# Patient Record
Sex: FEMALE
[De-identification: names, ages, dates, MRNs, and addresses within clinical notes are randomized per-mention and may not be internally consistent; named-entity substitution may affect disease eponyms.]

---

## 2018-08-27 ENCOUNTER — HOSPITAL ENCOUNTER (EMERGENCY)
Dept: HOSPITAL 25 - UCEAST | Age: 28
Discharge: HOME | End: 2018-08-27
Payer: MEDICAID

## 2018-08-27 VITALS — SYSTOLIC BLOOD PRESSURE: 105 MMHG | DIASTOLIC BLOOD PRESSURE: 57 MMHG

## 2018-08-27 DIAGNOSIS — Z88.6: ICD-10-CM

## 2018-08-27 DIAGNOSIS — Z88.8: ICD-10-CM

## 2018-08-27 DIAGNOSIS — Z79.899: ICD-10-CM

## 2018-08-27 DIAGNOSIS — H61.22: Primary | ICD-10-CM

## 2018-08-27 DIAGNOSIS — Z87.891: ICD-10-CM

## 2018-08-27 DIAGNOSIS — F20.9: ICD-10-CM

## 2018-08-27 DIAGNOSIS — H66.92: ICD-10-CM

## 2018-08-27 PROCEDURE — G0463 HOSPITAL OUTPT CLINIC VISIT: HCPCS

## 2018-08-27 PROCEDURE — 99213 OFFICE O/P EST LOW 20 MIN: CPT

## 2018-08-27 NOTE — UC
Ear Complaint HPI





- HPI Summary


HPI Summary: 





28 y/o female presents to the urgent care c/o B/L ear pain for the past week. 

Pt reports about 10 days ago she was Dx w/ Otitis media and externa on the left 

ear and was Rx otic drops and Amoxicillin PO. She finished medications and 

symptoms are returning. LF<RT.  Pain is 8/10 and radiating to her upper jaw.  

She also has some caries on B/L upper jaw. But she has an appt w/ Oral surgeon 

in 2 weeks. Pt took Ibuprofen PO about 1hrs ago to alleviate symptoms. Pt 

denies fever, dizziness, trismus, tinnitus, SOB, chest pain, abdominal pain, N/V

/D. 








- History of Current Complaint


Chief Complaint: UCEar


Stated Complaint: EAR PAIN


Time Seen by Provider: 08/27/18 17:08


Hx Obtained From: Patient


Hx Last Menstrual Period: 082218


Pregnant?: No


Onset/Duration: Gradual Onset, Lasting Weeks - 1 week, Still Present


Severity Initially: Moderate


Severity Currently: Moderate


Pain Intensity: 9


Pain Scale Used: 0-10 Numeric


Aggravating Factors: Other - touch


Alleviating Factors: OTC Meds


Associated Signs/Symptoms: Positive: Hearing Loss





- Allergies/Home Medications


Allergies/Adverse Reactions: 


 Allergies











Allergy/AdvReac Type Severity Reaction Status Date / Time


 


ibuprofen Allergy  Unknown Verified 08/27/18 16:55





   Reaction  





   Details  


 


prednisone Allergy  Unknown Verified 08/27/18 16:55





   Reaction  





   Details  











Home Medications: 


 Home Medications





Acamprosate Calcium 666 mg PO TID 08/27/18 [History Confirmed 08/27/18]


Acetaminophen TAB* [Tylenol TAB*] 650 mg PO Q4H PRN 08/27/18 [History Confirmed 

08/27/18]


Albuterol HFA INHALER* [Ventolin HFA Inhaler*] 2 puff INH Q4H PRN 08/27/18 [

History Confirmed 08/27/18]


Nicotine PATCH 14 MG/24 HR* 14 mg TRANSDERM DAILY 08/27/18 [History Confirmed 08 /27/18]


Nicotine Polacrilex [Nicotine Gum] 4 mg BC BID PRN 08/27/18 [History Confirmed 

08/27/18]


QUEtiapine XR TAB* [Seroquel Xr 200 TAB*] 200 mg PO BEDTIME 08/27/18 [History 

Confirmed 08/27/18]


Sertraline* [Zoloft*] 50 mg PO DAILY 08/27/18 [History Confirmed 08/27/18]


busPIRone TAB* [Buspar TAB *] 15 mg PO TID 08/27/18 [History Confirmed 08/27/18]


hydrOXYzine pamoate [Vistaril] 25 mg PO TID 08/27/18 [History Confirmed 08/27/18

]











PMH/Surg Hx/FS Hx/Imm Hx


Previously Healthy: Yes


Psychological History: Depression, Schizophrenia


Other Cancer History: Leukemia as a child





- Surgical History


Surgical History: Yes


Surgery Procedure, Year, and Place: ulcer repair as a child





- Family History


Known Family History: Positive: None - Pt denies FMHX





- Social History


Occupation: Employed Full-time


Lives: With Family


Alcohol Use: None


Substance Use Type: None


Smoking Status (MU): Former Smoker





Review of Systems


Constitutional: Negative


Skin: Negative


Eyes: Negative


ENT: Negative - ear pain, Ear Ache


Respiratory: Negative


Cardiovascular: Negative


Gastrointestinal: Negative


Genitourinary: Negative


Motor: Negative


Neurovascular: Negative


Musculoskeletal: Negative


Neurological: Headache


Psychological: Negative


Is Patient Immunocompromised?: No


All Other Systems Reviewed And Are Negative: Yes





Physical Exam





- Summary


Physical Exam Summary: 





Vital signs: reviewed


General: well developed, well nourished female sitting in the examining table w/

o any apparent distress


Skin: Pink, warm and dry, no evidence of atopic dermatitis, psoriasis, 

seborrhea.


HEENT:


-Head: atraumatic, non tender; no scalp dermatitis.


-Eyes: sclera and conjunctiva clear, PERRLA, EOMI


-Ears: no pre- or postauricular lymphadenopathy or erythema; RT external ear 

canal impacted w/ cerumen unable to visualize TM, Pinna tender  on palpation, 

LF external ear canal clear and LF TM WNL. TMs normal w/out bulging or 

retraction. Good light reflex. No fluid level, vesicles, or bullae. No 

perforation.


-Nose/Face: erythematous and edematous nasal mucosa with clear rhinorrhea, no 

frontal or maxillary sinus tender to palpation.


-Mouth/Throat: Mucous membrane moist, posterior pharynx clear, no erythema or 

exudates.


Neck: supple, FROM, nontender, no lymphadenopathy, no meningismus.


Chest: Clear to auscultation, normal breath sounds


Abd: soft, Bowel sounds active, Nontender.


Back: no spinal or CVAT


Neuro: A&O x4, GCS 15, no focal neuro deficits, normal behavior for age.








Triage Information Reviewed: Yes


Vital Signs: 


 Initial Vital Signs











Temp  98.4 F   08/27/18 16:47


 


Pulse  69   08/27/18 16:47


 


Resp  16   08/27/18 16:47


 


BP  105/57   08/27/18 16:47


 


Pulse Ox  100   08/27/18 16:47














Ear Complaint Course/Dx





- Course


Course Of Treatment: 28 y/o female presents to the urgent care c/o B/L ear pain 

for the past week. Pt reports about 10 days ago she was Dx w/ Otitis media and 

externa on the left ear and was Rx otic drops and Amoxicillin PO. She finished 

medications and symptoms are returning. LF<RT.  Pain is 8/10 and radiating to 

her upper jaw.  She also has some caries on B/L upper jaw. But she has an appt w

/ Oral surgeon in 2 weeks. Pt took Ibuprofen PO about 1hrs ago to alleviate 

symptoms. Pt denies fever, dizziness, trismus, tinnitus, SOB, chest pain, 

abdominal pain, N/V/D. Pt w/ Left external ear canal impacted w/ cerumen on 

examination. Left ear irrigation ordered and performed by Nurse. Pt tolerated 

well procedure and external ear canla clear and LF TM injected w/ Erythema. Pt w

/ Otitis Media. Pt Rx Augmentin PO. Pt Advised to conmtinue taking tylenol for 

pain.  If symptoms do not improve or worsen to return to the urgent care or f/u 

with PCP in 3 days for further management. Pt  understood and agreed with D/C 

instructions.





- Differential Dx/Diagnosis


Differential Diagnosis/HQI/PQRI: Barotrauma, Cerumen Impaction, Otitis Externa, 

Otitis Media


Provider Diagnoses: 1- Left ear cerumen impaction.  2- Left otitis media





Discharge





- Sign-Out/Discharge


Documenting (check all that apply): Patient Departure - D/C home


All imaging exams completed and their final reports reviewed: No Studies





- Discharge Plan


Condition: Stable


Disposition: HOME


Prescriptions: 


Amoxicillin/Clavulanate TAB* [Augmentin *] 875 mg PO BID #20 tab


Patient Education Materials:  Cerumen Impaction (ED), Ear Infection (ED)


Referrals: 


[facility] [Outside] - 3 Days


Additional Instructions: 


1- Please take the full course of the antibiotic to avoid resistance.


2-Please takeTylenol  PO q6-8hrs prn as instructed after meals to alleviate 

pain and swelling. Apply ice to alleviate pain,  Increase fluid intake, eat well

, rest and avoid strenuous exercise


3-If symptoms do not improve or worsen please return to the urgent care or f/u 

with your PCP 3 days for further evaluation and treatment.











- Billing Disposition and Condition


Condition: STABLE


Disposition: Home

## 2018-09-05 ENCOUNTER — HOSPITAL ENCOUNTER (EMERGENCY)
Dept: HOSPITAL 25 - ED | Age: 28
Discharge: HOME | End: 2018-09-05
Payer: MEDICAID

## 2018-09-05 VITALS — DIASTOLIC BLOOD PRESSURE: 85 MMHG | SYSTOLIC BLOOD PRESSURE: 129 MMHG

## 2018-09-05 DIAGNOSIS — S93.502A: Primary | ICD-10-CM
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
## 2018-09-05 PROCEDURE — 99281 EMR DPT VST MAYX REQ PHY/QHP: CPT

## 2018-09-05 NOTE — RAD
INDICATION:  Pain after a metal door ran over the left great toe



TECHNIQUE: 4 views of the left great toe were obtained.



FINDINGS: The visualized bones are normal alignment. Joint spaces appear maintained. No

fracture is seen.



IMPRESSION:  NO EVIDENCE FOR FRACTURE.



 IF THE PATIENT'S SYMPTOMS PERSIST RECOMMEND FOLLOW-UP IMAGING.

## 2018-09-05 NOTE — ED
Lower Extremity





- HPI Summary


HPI Summary: 





Complains of pain to big toe of left foot after getting hit by a door today.  

Pain radiates up to left knee.  Denies loss of sensation.  Pain with 

ambulation.  Denies any other injuries or pain,





- History of Current Complaint


Chief Complaint: EDExtremityLower


Stated Complaint: LT BIG TOE INJURY


Time Seen by Provider: 09/05/18 15:58


Hx Obtained From: Patient


Hx Last Menstrual Period: 082218


Mechanism Of Injury: Blunt Trauma


Onset of Pain: Immediate


Onset/Duration: Hours


Severity Initially: Severe


Severity Currently: Severe


Pain Intensity: 10


Pain Scale Used: 0-10 Numeric


Timing: Constant


Location: Radiates To - Left knee


Character Of Pain: Throbbing


Associated Signs And Symptoms: Positive: Bruising


Aggravating Factor(s): Ambulation, Weight Bearing


Alleviating Factor(s): Rest, Elevation, OTC Meds


Able to Bear Weight: Yes





- Allergies/Home Medications


Allergies/Adverse Reactions: 


 Allergies











Allergy/AdvReac Type Severity Reaction Status Date / Time


 


prednisone Allergy  Unknown Verified 09/05/18 15:31





   Reaction  





   Details  














PMH/Surg Hx/FS Hx/Imm Hx


Endocrine/Hematology History: 


   Denies: Hx Anticoagulant Therapy


Cardiovascular History: 


   Denies: Hx Cardiac Arrest


Respiratory History: Reports: Hx Asthma


Neurological History: 


   Denies: Hx CVA





- Surgical History


Surgery Procedure, Year, and Place: ulcer repair as a child


Infectious Disease History: No


Infectious Disease History: 


   Denies: Traveled Outside the [place] in Last 30 Days





- Family History


Known Family History: Positive: None - Pt denies FMHX





- Social History


Alcohol Use: None


Substance Use Type: Reports: None


Smoking Status (MU): Former Smoker





Review of Systems


Constitutional: Negative


Eyes: Negative


ENT: Negative


Cardiovascular: Negative


Respiratory: Negative


Gastrointestinal: Negative


Genitourinary: Negative


Musculoskeletal: Other


Positive: Bruising


Neurological: Negative


Psychological: Normal


All Other Systems Reviewed And Are Negative: Yes





Physical Exam





- Summary


Physical Exam Summary: 





No swelling, erythema, ecchymosis, deformity, extra warmth noted to pick toe of 

left foot or left foot in general.  PMS intact on big toe.  Pain with passive 

flexion and extension.


Triage Information Reviewed: Yes


Vital Signs On Initial Exam: 


 Initial Vitals











Temp Pulse Resp BP Pulse Ox


 


 98.7 F   83   16   131/83   98 


 


 09/05/18 15:27  09/05/18 15:27  09/05/18 15:27  09/05/18 15:27  09/05/18 15:27











Vital Signs Reviewed: Yes


Appearance: Positive: Well-Appearing


Skin: Positive: Warm


Head/Face: Positive: Normal Head/Face Inspection


Eyes: Positive: Normal


Neck: Positive: Supple


Respiratory/Lung Sounds: Positive: Clear to Auscultation


Cardiovascular: Positive: Normal


Abdomen Description: Positive: Nontender


Musculoskeletal: Positive: Normal


Neurological: Positive: Normal


Psychiatric: Positive: Normal


AVPU Assessment: Alert





- Pasquale Coma Scale


Best Eye Response: 4 - Spontaneous


Best Motor Response: 6 - Obeys Commands


Best Verbal Response: 5 - Oriented


Coma Scale Total: 15





Diagnostics





- Vital Signs


 Vital Signs











  Temp Pulse Resp BP Pulse Ox


 


 09/05/18 15:27  98.7 F  83  16  131/83  98














- Laboratory


Lab Statement: Any lab studies that have been ordered have been reviewed, and 

results considered in the medical decision making process.





- Radiology


  ** toie


Xray Interpretation: No Acute Changes


Radiology Interpretation Completed By: Radiologist





Lower Extremity Course/Dx





- Course


Course Of Treatment: Complains of pain to big toe of left foot after getting 

hit by a door today.  Pain radiates up to left knee.  Denies loss of sensation.

  Pain with ambulation.  Denies any other injuries or pain,.  Physical exam:No 

swelling, erythema, ecchymosis, deformity, extra warmth noted to pick toe of 

left foot or left foot in general.  PMS intact on big toe.  Pain with passive 

flexion and extension.  X-ray negative for acute process.  Recommend ice, 

elevation, ibuprofen.





- Diagnoses


Provider Diagnoses: 


 Sprain of toe, great, left








Discharge





- Sign-Out/Discharge


Documenting (check all that apply): Patient Departure





- Discharge Plan


Condition: Stable


Disposition: HOME


Patient Education Materials:  Foot Contusion (ED)


Referrals: 


No Primary Care Phys,NOPCP [Primary Care Provider] - 


Additional Instructions: 


Ice and ibuprofen and rest.  Return to the ED for any new or worsening symptoms





- Billing Disposition and Condition


Condition: STABLE


Disposition: Home